# Patient Record
Sex: FEMALE | Race: WHITE
[De-identification: names, ages, dates, MRNs, and addresses within clinical notes are randomized per-mention and may not be internally consistent; named-entity substitution may affect disease eponyms.]

---

## 2023-05-19 PROBLEM — Z00.00 ENCOUNTER FOR PREVENTIVE HEALTH EXAMINATION: Status: ACTIVE | Noted: 2023-05-19

## 2023-06-14 ENCOUNTER — APPOINTMENT (OUTPATIENT)
Dept: CARDIOLOGY | Facility: CLINIC | Age: 68
End: 2023-06-14
Payer: MEDICARE

## 2023-06-14 ENCOUNTER — NON-APPOINTMENT (OUTPATIENT)
Age: 68
End: 2023-06-14

## 2023-06-14 VITALS
RESPIRATION RATE: 12 BRPM | OXYGEN SATURATION: 97 % | WEIGHT: 200 LBS | DIASTOLIC BLOOD PRESSURE: 84 MMHG | HEIGHT: 65 IN | HEART RATE: 81 BPM | BODY MASS INDEX: 33.32 KG/M2 | SYSTOLIC BLOOD PRESSURE: 127 MMHG

## 2023-06-14 DIAGNOSIS — R07.89 OTHER CHEST PAIN: ICD-10-CM

## 2023-06-14 DIAGNOSIS — G43.909 MIGRAINE, UNSPECIFIED, NOT INTRACTABLE, W/OUT STATUS MIGRAINOSUS: ICD-10-CM

## 2023-06-14 DIAGNOSIS — Z78.9 OTHER SPECIFIED HEALTH STATUS: ICD-10-CM

## 2023-06-14 PROCEDURE — 93000 ELECTROCARDIOGRAM COMPLETE: CPT

## 2023-06-14 PROCEDURE — 99203 OFFICE O/P NEW LOW 30 MIN: CPT | Mod: 25

## 2023-06-14 RX ORDER — CALCIUM CARBONATE 500 MG/1
TABLET, CHEWABLE ORAL
Refills: 0 | Status: ACTIVE | COMMUNITY

## 2023-06-14 RX ORDER — ZOLPIDEM TARTRATE 10 MG/1
10 TABLET ORAL
Refills: 0 | Status: ACTIVE | COMMUNITY

## 2023-06-14 NOTE — HISTORY OF PRESENT ILLNESS
[FreeTextEntry1] : Kelsie is a 67-year-old female HTN with abnormal ECG. She had echo and stress before lap band 20 years ago and that’s when she was told ecg was abnormal. Two years ago in [facility] was having chest pain with stress and had normal ECHO there. Recent CPE with more pronounced t waves and referred here. Still gets fleeting chest pain with stress. Going to [facility] in July.

## 2023-06-14 NOTE — DISCUSSION/SUMMARY
[FreeTextEntry1] : The patient is a 67-year-old female HTN with abnormal ecg and atypical chest pain\par #1 CV- unchanged from last month, ECHO before she leaves and if normal then may travel, stress when returns\par #2 HTN- c/w amlodipine and metoprolol\par #3 General- , gets easily stressed, [place] [facility] [place] [EKG obtained to assist in diagnosis and management of assessed problem(s)] : EKG obtained to assist in diagnosis and management of assessed problem(s)

## 2023-06-14 NOTE — REVIEW OF SYSTEMS
[SOB] : no shortness of breath [Chest Discomfort] : chest discomfort [Lower Ext Edema] : no extremity edema

## 2023-06-27 ENCOUNTER — APPOINTMENT (OUTPATIENT)
Dept: CARDIOLOGY | Facility: CLINIC | Age: 68
End: 2023-06-27
Payer: MEDICARE

## 2023-06-27 PROCEDURE — 93306 TTE W/DOPPLER COMPLETE: CPT

## 2023-06-28 ENCOUNTER — APPOINTMENT (OUTPATIENT)
Dept: CARDIOLOGY | Facility: CLINIC | Age: 68
End: 2023-06-28
Payer: MEDICARE

## 2023-06-28 VITALS
DIASTOLIC BLOOD PRESSURE: 77 MMHG | TEMPERATURE: 97.5 F | BODY MASS INDEX: 33.28 KG/M2 | RESPIRATION RATE: 14 BRPM | HEART RATE: 72 BPM | SYSTOLIC BLOOD PRESSURE: 121 MMHG | WEIGHT: 200 LBS | OXYGEN SATURATION: 98 %

## 2023-06-28 DIAGNOSIS — I10 ESSENTIAL (PRIMARY) HYPERTENSION: ICD-10-CM

## 2023-06-28 DIAGNOSIS — R94.31 ABNORMAL ELECTROCARDIOGRAM [ECG] [EKG]: ICD-10-CM

## 2023-06-28 PROCEDURE — 99213 OFFICE O/P EST LOW 20 MIN: CPT

## 2023-06-28 RX ORDER — AMLODIPINE BESYLATE 5 MG/1
5 TABLET ORAL
Refills: 0 | Status: ACTIVE | COMMUNITY

## 2023-06-28 RX ORDER — METOPROLOL SUCCINATE 50 MG/1
50 TABLET, EXTENDED RELEASE ORAL
Refills: 0 | Status: ACTIVE | COMMUNITY

## 2023-06-28 NOTE — REVIEW OF SYSTEMS
[Chest Discomfort] : chest discomfort [SOB] : no shortness of breath [Lower Ext Edema] : no extremity edema

## 2023-06-28 NOTE — DISCUSSION/SUMMARY
[FreeTextEntry1] : The patient is a 67-year-old female HTN with abnormal ecg and atypical chest pain\par #1 CV- unchanged from last month, ECHO normal just LVH\par #2 HTN- c/w amlodipine and metoprolol\par #3 General- , gets easily stressed, looking forward to summer in [facility], taking TUMS for symptoms and neg EGD two years ago.

## 2023-06-28 NOTE — HISTORY OF PRESENT ILLNESS
[FreeTextEntry1] : Kelsie continues to have symptoms which improve with TUMS. [place] [facility] [place] and everything was ok. Stress with 92 yo mother and has to take her with her to [facility].

## 2023-07-03 ENCOUNTER — APPOINTMENT (OUTPATIENT)
Dept: CARDIOLOGY | Facility: CLINIC | Age: 68
End: 2023-07-03

## 2023-12-13 ENCOUNTER — APPOINTMENT (OUTPATIENT)
Dept: PODIATRY | Facility: CLINIC | Age: 68
End: 2023-12-13
Payer: MEDICARE

## 2023-12-13 DIAGNOSIS — Z86.79 PERSONAL HISTORY OF OTHER DISEASES OF THE CIRCULATORY SYSTEM: ICD-10-CM

## 2023-12-13 PROCEDURE — 73620 X-RAY EXAM OF FOOT: CPT | Mod: LT

## 2023-12-13 PROCEDURE — 99203 OFFICE O/P NEW LOW 30 MIN: CPT

## 2023-12-13 PROCEDURE — 73600 X-RAY EXAM OF ANKLE: CPT | Mod: LT

## 2023-12-14 PROBLEM — Z86.79 HISTORY OF HYPERTENSION: Status: RESOLVED | Noted: 2023-12-14 | Resolved: 2023-12-14

## 2023-12-16 NOTE — HISTORY OF PRESENT ILLNESS
[Sneakers] : jose alejandro [FreeTextEntry1] : Patient presents today after falling in a pothole one week ago and she injured her left ankle and foot.  She presents today with pain, ecchymosis, swelling to the left ankle and foot. There is pain over the lateral collateral ligaments.  Pain over the deltoid and dorsolateral aspect of the left foot.

## 2023-12-16 NOTE — PHYSICAL EXAM
[Ankle Swelling (On Exam)] : present [Ankle Swelling On The Left] : of the left ankle [2+] : left foot dorsalis pedis 2+ [Skin Lesions] : no skin lesions [Sensation] : the sensory exam was normal to light touch and pinprick [No Focal Deficits] : no focal deficits [Deep Tendon Reflexes (DTR)] : deep tendon reflexes were 2+ and symmetric [Motor Exam] : the motor exam was normal [General Appearance - Alert] : alert [Oriented To Time, Place, And Person] : oriented to person, place, and time [Varicose Veins Of Lower Extremities] : not present [] : not present [Delayed in the Right Toes] : capillary refills normal in right toes [Delayed in the Left Toes] : capillary refills normal in the left toes [de-identified] : Pain over the lateral collateral ligaments.  Pain over the deltoid.  No pain in the syndesmosis.  Mild pain over the dorsolateral aspect of the foot.  She is unsure how the foot twisted. [Skin Induration] : no skin induration [Foot Ulcer] : no foot ulcer [FreeTextEntry1] : Ecchymosis, lateral anterior ankle.

## 2023-12-16 NOTE — ASSESSMENT
[FreeTextEntry1] : Impression: Severe sprain, left ankle (S93.492A, S93.422A, S93.412A) including anterior talofibular ligament, deltoid ligament, calcaneofibular ligaments.    Treatment: Patient was placed into short Cam boot by the orthotist.  Patient was given home care instructions.  Any questions or problems, patient is to contact the office.

## 2023-12-16 NOTE — PROCEDURE
Prescription filled per refill protocol.   
[FreeTextEntry1] : X-rays were taken to rule out for fracture or stress injury. (2 views - left ankle, 2 views - left foot) No evidence of any fracture/dislocation.

## 2023-12-18 ENCOUNTER — APPOINTMENT (OUTPATIENT)
Dept: CARDIOLOGY | Facility: CLINIC | Age: 68
End: 2023-12-18

## 2023-12-27 ENCOUNTER — APPOINTMENT (OUTPATIENT)
Dept: PODIATRY | Facility: CLINIC | Age: 68
End: 2023-12-27
Payer: MEDICARE

## 2023-12-27 PROCEDURE — 99213 OFFICE O/P EST LOW 20 MIN: CPT

## 2024-01-03 NOTE — PHYSICAL EXAM
[General Appearance - Alert] : alert [Ankle Swelling (On Exam)] : present [Ankle Swelling On The Left] : of the left ankle [2+] : left foot dorsalis pedis 2+ [Skin Lesions] : no skin lesions [Sensation] : the sensory exam was normal to light touch and pinprick [No Focal Deficits] : no focal deficits [Deep Tendon Reflexes (DTR)] : deep tendon reflexes were 2+ and symmetric [Motor Exam] : the motor exam was normal [Oriented To Time, Place, And Person] : oriented to person, place, and time [Varicose Veins Of Lower Extremities] : not present [] : not present [Delayed in the Right Toes] : capillary refills normal in right toes [Delayed in the Left Toes] : capillary refills normal in the left toes [de-identified] : Pain over the lateral collateral ligaments.  Pain over the deltoid.  No pain in the syndesmosis.  Mild pain over the dorsolateral aspect of the foot.  She is unsure how the foot twisted. [Foot Ulcer] : no foot ulcer [Skin Induration] : no skin induration [FreeTextEntry1] : Ecchymosis, lateral anterior ankle.

## 2024-01-03 NOTE — ASSESSMENT
[FreeTextEntry1] : Impression: Persistent sprain, left ankle (S93.492A, S93.422A, S93.412A) including anterior talofibular ligament, deltoid ligament, calcaneofibular ligaments.  At this time, the patient is requesting an MRI because [place] and [place] and is concerned that the ligaments are damaged.  Explained to her that the ligaments are damaged and that is what an MRI would show.  There may be a bone bruise or other contusion or an isolated small fracture.   An MRI without contrast was ordered.  Will reevaluate after the MRI.  Any questions or problems, patient is to contact the office.

## 2024-01-03 NOTE — HISTORY OF PRESENT ILLNESS
[FreeTextEntry1] : Patient presents today with continued pain to the left ankle and foot.  She was wearing the boot but is not wearing it today.  There is still some resolving ecchymosis.  She has pain in the sinus tarsi, lateral aspect of the ankle.  She continues to ambulate now in a shoe; she was advised to wear an ankle support.  She states that she has a Velcro support.  Explained that she should not re-injure the ankle.  No other changes to her medical status.

## 2024-01-08 ENCOUNTER — APPOINTMENT (OUTPATIENT)
Dept: ORTHOPEDIC SURGERY | Facility: CLINIC | Age: 69
End: 2024-01-08
Payer: MEDICARE

## 2024-01-08 DIAGNOSIS — S80.01XD CONTUSION OF RIGHT KNEE, SUBSEQUENT ENCOUNTER: ICD-10-CM

## 2024-01-08 PROCEDURE — 99204 OFFICE O/P NEW MOD 45 MIN: CPT

## 2024-01-08 PROCEDURE — 73564 X-RAY EXAM KNEE 4 OR MORE: CPT | Mod: RT

## 2024-01-08 NOTE — PHYSICAL EXAM
[de-identified] : Right Knee Exam:   Skin: Clean, dry, intact Inspection: No obvious malalignment, no masses, min swelling, no effusion, +resolving ecchymosis to anterior knee.  Pulses: 2+ DP/PT pulses ROM: 0-135 degrees of flexion. + pain with deep knee flexion/extension. Tenderness: +MJLT. + LJLT. +ttp over the proximal pole patella. No pain over the patella facets. No pain to the quadriceps tendon. No pain to the patella tendon. No posterior knee tenderness. Stability: Stable to varus, valgus. Negative Lachman testing. Negative anterior drawer, negative posterior drawer. Strength: 5/5 Q/H/TA/GS/EHL, without atrophy Neuro: Intact to light touch throughout, DTRs normal Additional Tests: Negative Jean Pierre's test, Negative patellar grind test Other findings: None. [de-identified] : The following radiographs were ordered and read by me during this patients visit. I reviewed each radiograph in detail with the patient and discussed the findings as highlighted below.   4 views of the right knee were obtained today, 01/08/2024, that show no acute fracture or dislocation. There is no medial, no lateral and mild patellofemoral degenerative changes seen. There is no significant malalignment. Calcifications within the lateral femoral condyle.

## 2024-01-08 NOTE — ADDENDUM
[FreeTextEntry1] : This note was written by Jacki Khalil on 01/08/2024 acting solely as a scribe for Dr. John Eaton.  All medical record entries made by the Scribe were at my, Dr. John Eaton, direction and personally dictated by me on 01/08/2024. I have personally reviewed the chart and agree that the record accurately reflects my personal performance of the history, physical exam, assessment and plan.

## 2024-01-08 NOTE — DISCUSSION/SUMMARY
[de-identified] : 69 y/o female with right knee contusion.   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  No significant internal derangement is suspected, and recommendations are made for conservative management at this time.  Recommendation: Begin trial of PT, Rx given. OTC NSAIDs or acetaminophen as tolerated, with application of ice/heat to the area 2-3x daily for 20 minutes after periods of activity.   Follow up as needed.

## 2024-01-08 NOTE — HISTORY OF PRESENT ILLNESS
[de-identified] : 68 year old female presents today with right knee pain s/p fall 12/6/23. Patient fell in parking lot, she stepped into a pothole with her left foot hit her right knee and right hand. She was seen by Dr. Lombardi, for left ankle and foot injury  referred for eval of knee. The pain shooting in nature, is brought on with kneeling, walking and getting out a car. She takes Tylenol for pain. Advil causes her stomach GERD.   The patient's past medical history, past surgical history, medications and allergies were reviewed by me today with the patient and documented accordingly. In addition, the patient's family and social history, which were noncontributory to this visit, were reviewed also.

## 2024-01-09 ENCOUNTER — APPOINTMENT (OUTPATIENT)
Dept: PODIATRY | Facility: CLINIC | Age: 69
End: 2024-01-09
Payer: MEDICARE

## 2024-01-09 DIAGNOSIS — S93.412A SPRAIN OF CALCANEOFIBULAR LIGAMENT OF LEFT ANKLE, INITIAL ENCOUNTER: ICD-10-CM

## 2024-01-09 DIAGNOSIS — S93.422A SPRAIN OF DELTOID LIGAMENT OF LEFT ANKLE, INITIAL ENCOUNTER: ICD-10-CM

## 2024-01-09 DIAGNOSIS — S93.492A SPRAIN OF OTHER LIGAMENT OF LEFT ANKLE, INITIAL ENCOUNTER: ICD-10-CM

## 2024-01-09 PROCEDURE — 99213 OFFICE O/P EST LOW 20 MIN: CPT

## 2024-01-10 NOTE — REASON FOR VISIT
[Follow-Up Visit] : a follow-up visit for [Ankle Pain] : ankle pain [Foot Pain] : foot pain [Ankle Sprain] : ankle sprain

## 2024-01-15 NOTE — ASSESSMENT
[FreeTextEntry1] : Impression: Persistent sprain, left ankle (S93.492A, S93.422A, S93.412A) including anterior talofibular ligament, deltoid ligament, calcaneofibular ligaments.    At this time, I feel that the patient would benefit from physical therapy and she was given a prescription.  Patient was given home care instructions.  Will reevaluate in 2 weeks.  Any questions or problems, patient is to contact the office.

## 2024-01-15 NOTE — PHYSICAL EXAM
[General Appearance - Alert] : alert [Ankle Swelling (On Exam)] : present [Ankle Swelling On The Left] : of the left ankle [2+] : left foot dorsalis pedis 2+ [Skin Lesions] : no skin lesions [Sensation] : the sensory exam was normal to light touch and pinprick [No Focal Deficits] : no focal deficits [Deep Tendon Reflexes (DTR)] : deep tendon reflexes were 2+ and symmetric [Motor Exam] : the motor exam was normal [Oriented To Time, Place, And Person] : oriented to person, place, and time [Varicose Veins Of Lower Extremities] : not present [] : not present [Delayed in the Right Toes] : capillary refills normal in right toes [Delayed in the Left Toes] : capillary refills normal in the left toes [de-identified] : Continued pain over the lateral collateral ligaments as well as over the deltoid.  No pain in the syndesmosis.  Mild pain over the dorsolateral aspect of the foot.   [Foot Ulcer] : no foot ulcer [Skin Induration] : no skin induration [FreeTextEntry1] : Ecchymosis, lateral anterior ankle, resolving.  Reduced swelling.

## 2024-01-15 NOTE — HISTORY OF PRESENT ILLNESS
[Sneakers] : jose alejandro [FreeTextEntry1] : Patient presents today for reevaluation of ankle sprain, left.  No new changes to her medical status.

## 2024-01-15 NOTE — PROCEDURE
[FreeTextEntry1] : MRI done at [facility] was independently reviewed by me.  There are contusions to multiple bones of the ankle including the talus and fibula, which is consistent with her injury.  Also, noted is the sprain of the ankle ligaments that were previously diagnosed.

## 2024-01-30 ENCOUNTER — APPOINTMENT (OUTPATIENT)
Dept: PODIATRY | Facility: CLINIC | Age: 69
End: 2024-01-30
Payer: MEDICARE

## 2024-01-30 PROCEDURE — 99213 OFFICE O/P EST LOW 20 MIN: CPT

## 2024-02-05 NOTE — ASSESSMENT
[FreeTextEntry1] : Impression: Persistent sprain, left ankle (S93.492A, S93.422A, S93.412A) including anterior talofibular ligament, deltoid ligament, calcaneofibular ligaments.    At this time, patient cannot be in physical therapy but she will go.  If pain persists, she was return to the office for further evaluation.  Otherwise, patient is discharged.  Any questions or problems, patient is to contact the office.

## 2024-02-05 NOTE — PHYSICAL EXAM
[General Appearance - Alert] : alert [Ankle Swelling (On Exam)] : present [Ankle Swelling On The Left] : of the left ankle [2+] : left foot dorsalis pedis 2+ [Skin Lesions] : no skin lesions [Sensation] : the sensory exam was normal to light touch and pinprick [No Focal Deficits] : no focal deficits [Deep Tendon Reflexes (DTR)] : deep tendon reflexes were 2+ and symmetric [Motor Exam] : the motor exam was normal [Oriented To Time, Place, And Person] : oriented to person, place, and time [Varicose Veins Of Lower Extremities] : not present [] : not present [Delayed in the Right Toes] : capillary refills normal in right toes [Delayed in the Left Toes] : capillary refills normal in the left toes [de-identified] : Pain is reduced.  Patient is able to ambulate better.   [Foot Ulcer] : no foot ulcer [Skin Induration] : no skin induration [FreeTextEntry1] : Swelling is reduced.

## 2024-02-05 NOTE — HISTORY OF PRESENT ILLNESS
[FreeTextEntry1] : Patient presents today for reevaluation of ankle sprain, left and right bruising.  Patient is much improved.  No new changes to her medical status.